# Patient Record
Sex: FEMALE | NOT HISPANIC OR LATINO | URBAN - METROPOLITAN AREA
[De-identification: names, ages, dates, MRNs, and addresses within clinical notes are randomized per-mention and may not be internally consistent; named-entity substitution may affect disease eponyms.]

---

## 2024-06-13 ENCOUNTER — OFFICE VISIT (OUTPATIENT)
Dept: PRIMARY CARE | Facility: CLINIC | Age: 54
End: 2024-06-13
Payer: COMMERCIAL

## 2024-06-13 VITALS
HEIGHT: 66 IN | OXYGEN SATURATION: 96 % | HEART RATE: 80 BPM | DIASTOLIC BLOOD PRESSURE: 80 MMHG | RESPIRATION RATE: 16 BRPM | BODY MASS INDEX: 32.47 KG/M2 | WEIGHT: 202 LBS | SYSTOLIC BLOOD PRESSURE: 124 MMHG

## 2024-06-13 DIAGNOSIS — Z12.31 SCREENING MAMMOGRAM FOR BREAST CANCER: ICD-10-CM

## 2024-06-13 DIAGNOSIS — D22.9 SKIN MOLE: ICD-10-CM

## 2024-06-13 DIAGNOSIS — J30.1 SEASONAL ALLERGIC RHINITIS DUE TO POLLEN: ICD-10-CM

## 2024-06-13 DIAGNOSIS — Z00.00 ROUTINE GENERAL MEDICAL EXAMINATION AT A HEALTH CARE FACILITY: Primary | ICD-10-CM

## 2024-06-13 DIAGNOSIS — Z13.6 SCREENING FOR CARDIOVASCULAR CONDITION: ICD-10-CM

## 2024-06-13 DIAGNOSIS — Z13.29 SCREENING FOR HYPOTHYROIDISM: ICD-10-CM

## 2024-06-13 DIAGNOSIS — I10 PRIMARY HYPERTENSION: ICD-10-CM

## 2024-06-13 DIAGNOSIS — R73.9 HYPERGLYCEMIA: ICD-10-CM

## 2024-06-13 DIAGNOSIS — S39.012A STRAIN OF LUMBAR REGION, INITIAL ENCOUNTER: ICD-10-CM

## 2024-06-13 PROCEDURE — 3074F SYST BP LT 130 MM HG: CPT | Performed by: INTERNAL MEDICINE

## 2024-06-13 PROCEDURE — 3079F DIAST BP 80-89 MM HG: CPT | Performed by: INTERNAL MEDICINE

## 2024-06-13 PROCEDURE — 99396 PREV VISIT EST AGE 40-64: CPT | Performed by: INTERNAL MEDICINE

## 2024-06-13 RX ORDER — CELECOXIB 200 MG/1
200 CAPSULE ORAL DAILY
COMMUNITY

## 2024-06-13 RX ORDER — HYDROXYZINE HYDROCHLORIDE 25 MG/1
25 TABLET, FILM COATED ORAL EVERY 8 HOURS
COMMUNITY
Start: 2022-02-21

## 2024-06-13 RX ORDER — CITALOPRAM 20 MG/1
20 TABLET, FILM COATED ORAL DAILY
COMMUNITY

## 2024-06-13 RX ORDER — ESTRADIOL 0.5 MG/.5G
0.5 GEL TOPICAL DAILY
COMMUNITY

## 2024-06-13 RX ORDER — VALACYCLOVIR HYDROCHLORIDE 1 G/1
1000 TABLET, FILM COATED ORAL 4 TIMES DAILY
COMMUNITY
Start: 2016-05-11

## 2024-06-13 RX ORDER — LORATADINE 10 MG/1
10 TABLET ORAL DAILY
Qty: 90 TABLET | Refills: 3 | Status: SHIPPED | OUTPATIENT
Start: 2024-06-13 | End: 2025-06-13

## 2024-06-13 ASSESSMENT — PATIENT HEALTH QUESTIONNAIRE - PHQ9
SUM OF ALL RESPONSES TO PHQ9 QUESTIONS 1 AND 2: 0
1. LITTLE INTEREST OR PLEASURE IN DOING THINGS: NOT AT ALL
2. FEELING DOWN, DEPRESSED OR HOPELESS: NOT AT ALL

## 2024-06-13 ASSESSMENT — ENCOUNTER SYMPTOMS
RHINORRHEA: 0
APPETITE CHANGE: 0
LOSS OF SENSATION IN FEET: 0
FEVER: 0
VOMITING: 0
CHILLS: 0
ABDOMINAL PAIN: 0
NAUSEA: 0
HEADACHES: 0
FATIGUE: 0
COUGH: 0
DIARRHEA: 0
SORE THROAT: 0
WEAKNESS: 0
SINUS PAIN: 0
SHORTNESS OF BREATH: 0
ARTHRALGIAS: 1
HEMATURIA: 0
DEPRESSION: 0
NERVOUS/ANXIOUS: 0
DIZZINESS: 0
SLEEP DISTURBANCE: 0
OCCASIONAL FEELINGS OF UNSTEADINESS: 0
PALPITATIONS: 0
FREQUENCY: 0
DIFFICULTY URINATING: 0
CONSTIPATION: 0
JOINT SWELLING: 0

## 2024-06-13 ASSESSMENT — PAIN SCALES - GENERAL: PAINLEVEL: 0-NO PAIN

## 2024-06-13 NOTE — PROGRESS NOTES
"Subjective   Patient ID: Anna Milan is a 54 y.o. female who presents for Annual Exam.    Patient is doing well overall. BP is nml. Denies chest pain and SOB, but reports congestion which she is managing with Claritin. She also reports mild, intermittent pain in her back that worsens at night. Has not received both doses of shingrix.       Diagnostics Reviewed:  Labs Reviewed:           Review of Systems   Constitutional:  Negative for appetite change, chills, fatigue and fever.   HENT:  Positive for congestion. Negative for ear pain, rhinorrhea, sinus pain and sore throat.    Eyes:  Negative for visual disturbance.   Respiratory:  Negative for cough and shortness of breath.    Cardiovascular:  Negative for chest pain and palpitations.   Gastrointestinal:  Negative for abdominal pain, constipation, diarrhea, nausea and vomiting.   Genitourinary:  Negative for difficulty urinating, frequency and hematuria.   Musculoskeletal:  Positive for arthralgias. Negative for joint swelling.   Skin:  Negative for rash.   Neurological:  Negative for dizziness, weakness and headaches.   Psychiatric/Behavioral:  Negative for sleep disturbance. The patient is not nervous/anxious.        Objective   /80   Pulse 80   Resp 16   Ht 1.664 m (5' 5.5\")   Wt 91.6 kg (202 lb)   SpO2 96%   BMI 33.10 kg/m²     Physical Exam  HENT:      Right Ear: Tympanic membrane normal.      Left Ear: Tympanic membrane normal.      Mouth/Throat:      Pharynx: Oropharynx is clear. No oropharyngeal exudate.   Eyes:      Conjunctiva/sclera: Conjunctivae normal.      Pupils: Pupils are equal, round, and reactive to light.   Neck:      Thyroid: No thyromegaly.      Vascular: No carotid bruit.   Cardiovascular:      Rate and Rhythm: Regular rhythm.      Heart sounds: Normal heart sounds.   Pulmonary:      Breath sounds: Normal breath sounds.   Abdominal:      Palpations: Abdomen is soft. There is no hepatomegaly.      Tenderness: There is no " abdominal tenderness.   Musculoskeletal:      Right hip: Normal.      Left hip: Normal.      Right knee: Normal.      Left knee: Normal.      Right lower leg: No edema.      Left lower leg: No edema.   Lymphadenopathy:      Cervical: No cervical adenopathy.   Skin:     General: Skin is warm.      Comments: No suspicious moles   Neurological:      Mental Status: She is alert and oriented to person, place, and time.      Gait: Gait is intact.   Psychiatric:         Mood and Affect: Mood and affect normal.         Behavior: Behavior normal. Behavior is cooperative.         Cognition and Memory: Cognition normal.         Assessment/Plan   Diagnoses and all orders for this visit:  Routine general medical examination at a health care facility  Seasonal allergic rhinitis due to pollen  -     loratadine (Claritin) 10 mg tablet; Take 1 tablet (10 mg) by mouth once daily.  -     CBC and Auto Differential; Future  -     Comprehensive Metabolic Panel; Future  -     Hemoglobin A1C; Future  -     TSH with reflex to Free T4 if abnormal; Future  -     Lipid Panel; Future  -     zoster vaccine-recombinant adjuvanted (Shingrix) 50 mcg/0.5 mL vaccine; Inject 0.5 mL into the muscle every 8 (eight) weeks.  Primary hypertension  -     CBC and Auto Differential; Future  -     Comprehensive Metabolic Panel; Future  -     Hemoglobin A1C; Future  -     TSH with reflex to Free T4 if abnormal; Future  -     Lipid Panel; Future  Hyperglycemia  -     CBC and Auto Differential; Future  -     Comprehensive Metabolic Panel; Future  -     Hemoglobin A1C; Future  -     TSH with reflex to Free T4 if abnormal; Future  -     Lipid Panel; Future  Screening for cardiovascular condition  -     CBC and Auto Differential; Future  -     Comprehensive Metabolic Panel; Future  -     Hemoglobin A1C; Future  -     TSH with reflex to Free T4 if abnormal; Future  -     Lipid Panel; Future  Screening for hypothyroidism  -     CBC and Auto Differential; Future  -      Comprehensive Metabolic Panel; Future  -     Hemoglobin A1C; Future  -     TSH with reflex to Free T4 if abnormal; Future  -     Lipid Panel; Future  Screening mammogram for breast cancer  -     CBC and Auto Differential; Future  -     Comprehensive Metabolic Panel; Future  -     Hemoglobin A1C; Future  -     TSH with reflex to Free T4 if abnormal; Future  -     Lipid Panel; Future  -     BI mammo bilateral screening tomosynthesis  Skin mole  -     Referral to Dermatology  Strain of lumbar region, initial encounter    Scribe Attestation  By signing my name below, ILaura, Scribbess   attest that this documentation has been prepared under the direction and in the presence of Dorcas Swan MD.

## 2024-06-27 ENCOUNTER — APPOINTMENT (OUTPATIENT)
Dept: RADIOLOGY | Facility: CLINIC | Age: 54
End: 2024-06-27
Payer: COMMERCIAL

## 2024-07-17 ENCOUNTER — HOSPITAL ENCOUNTER (OUTPATIENT)
Dept: RADIOLOGY | Facility: CLINIC | Age: 54
Discharge: HOME | End: 2024-07-17
Payer: COMMERCIAL

## 2024-07-17 PROCEDURE — 77067 SCR MAMMO BI INCL CAD: CPT

## 2024-07-18 ENCOUNTER — APPOINTMENT (OUTPATIENT)
Dept: RADIOLOGY | Facility: CLINIC | Age: 54
End: 2024-07-18
Payer: COMMERCIAL

## 2024-07-26 ENCOUNTER — APPOINTMENT (OUTPATIENT)
Dept: LAB | Facility: LAB | Age: 54
End: 2024-07-26
Payer: COMMERCIAL

## 2024-09-17 ENCOUNTER — APPOINTMENT (OUTPATIENT)
Dept: DERMATOLOGY | Facility: CLINIC | Age: 54
End: 2024-09-17
Payer: COMMERCIAL

## 2025-02-28 ENCOUNTER — OFFICE VISIT (OUTPATIENT)
Dept: PRIMARY CARE | Facility: CLINIC | Age: 55
End: 2025-02-28
Payer: COMMERCIAL

## 2025-02-28 VITALS
BODY MASS INDEX: 33.11 KG/M2 | DIASTOLIC BLOOD PRESSURE: 80 MMHG | RESPIRATION RATE: 16 BRPM | SYSTOLIC BLOOD PRESSURE: 142 MMHG | HEIGHT: 66 IN | HEART RATE: 76 BPM | OXYGEN SATURATION: 98 % | WEIGHT: 206 LBS

## 2025-02-28 DIAGNOSIS — I10 PRIMARY HYPERTENSION: ICD-10-CM

## 2025-02-28 DIAGNOSIS — Z13.29 SCREENING FOR HYPOTHYROIDISM: ICD-10-CM

## 2025-02-28 DIAGNOSIS — R73.9 HYPERGLYCEMIA: ICD-10-CM

## 2025-02-28 DIAGNOSIS — I10 ESSENTIAL (PRIMARY) HYPERTENSION: ICD-10-CM

## 2025-02-28 DIAGNOSIS — Z13.6 SCREENING FOR CARDIOVASCULAR CONDITION: ICD-10-CM

## 2025-02-28 DIAGNOSIS — M76.62 ACHILLES TENDINITIS OF LEFT LOWER EXTREMITY: Primary | ICD-10-CM

## 2025-02-28 PROCEDURE — 3077F SYST BP >= 140 MM HG: CPT | Performed by: INTERNAL MEDICINE

## 2025-02-28 PROCEDURE — 3008F BODY MASS INDEX DOCD: CPT | Performed by: INTERNAL MEDICINE

## 2025-02-28 PROCEDURE — 99213 OFFICE O/P EST LOW 20 MIN: CPT | Performed by: INTERNAL MEDICINE

## 2025-02-28 PROCEDURE — 3079F DIAST BP 80-89 MM HG: CPT | Performed by: INTERNAL MEDICINE

## 2025-02-28 RX ORDER — TRIAMTERENE/HYDROCHLOROTHIAZID 37.5-25 MG
1 TABLET ORAL DAILY
Qty: 90 TABLET | Refills: 3 | Status: SHIPPED | OUTPATIENT
Start: 2025-02-28 | End: 2026-02-23

## 2025-02-28 ASSESSMENT — PATIENT HEALTH QUESTIONNAIRE - PHQ9
2. FEELING DOWN, DEPRESSED OR HOPELESS: NOT AT ALL
SUM OF ALL RESPONSES TO PHQ9 QUESTIONS 1 AND 2: 0
1. LITTLE INTEREST OR PLEASURE IN DOING THINGS: NOT AT ALL

## 2025-02-28 ASSESSMENT — ENCOUNTER SYMPTOMS
DEPRESSION: 0
NAUSEA: 0
SHORTNESS OF BREATH: 0
ARTHRALGIAS: 1
LOSS OF SENSATION IN FEET: 0
DIARRHEA: 0
DIZZINESS: 0
ABDOMINAL PAIN: 0
CONSTIPATION: 0
PALPITATIONS: 0
COUGH: 0
MYALGIAS: 1
OCCASIONAL FEELINGS OF UNSTEADINESS: 0

## 2025-02-28 ASSESSMENT — PAIN SCALES - GENERAL: PAINLEVEL_OUTOF10: 6

## 2025-02-28 NOTE — PROGRESS NOTES
"Subjective   Patient ID: Anna Milan is a 54 y.o. female who presents for Fall and Shoulder Pain.    Patient had a recent fall episode where she slipped on ice and fell on her L shoulder which is now painful to touch. Also, gets some pain in her L heel/achilles tendon which is exacerbated with walking/putting pressure on it. Has been taking Celebrex to help with the pain.     Diagnostics Reviewed:  Labs Reviewed:         Review of Systems   Respiratory:  Negative for cough and shortness of breath.    Cardiovascular:  Negative for chest pain and palpitations.   Gastrointestinal:  Negative for abdominal pain, constipation, diarrhea and nausea.   Musculoskeletal:  Positive for arthralgias (shoulder) and myalgias (L achilles).   Neurological:  Negative for dizziness.     Objective   /80   Pulse 76   Resp 16   Ht 1.664 m (5' 5.5\")   Wt 93.4 kg (206 lb)   SpO2 98%   BMI 33.76 kg/m²     Physical Exam  Cardiovascular:      Rate and Rhythm: Normal rate and regular rhythm.      Heart sounds: Normal heart sounds.   Pulmonary:      Breath sounds: Normal breath sounds.   Abdominal:      Palpations: Abdomen is soft. There is no hepatomegaly.      Tenderness: There is no abdominal tenderness.   Musculoskeletal:         General: Swelling (L achilles tendon) present.      Right lower leg: No edema.      Left lower leg: No edema.   Neurological:      Mental Status: She is alert.      Gait: Gait normal.       Assessment/Plan   Diagnoses and all orders for this visit:  Achilles tendinitis of left lower extremity  -     Referral to Podiatry; Future  -     Referral to Physical Therapy; Future  -     CBC and Auto Differential; Future  -     Comprehensive Metabolic Panel; Future  -     Uric Acid; Future  -     Hemoglobin A1C; Future  -     Lipid Panel; Future  -     TSH with reflex to Free T4 if abnormal; Future  Primary hypertension  -     CBC and Auto Differential; Future  -     Comprehensive Metabolic Panel; Future  -     " Uric Acid; Future  -     Hemoglobin A1C; Future  -     Lipid Panel; Future  -     TSH with reflex to Free T4 if abnormal; Future  Hyperglycemia  -     CBC and Auto Differential; Future  -     Comprehensive Metabolic Panel; Future  -     Uric Acid; Future  -     Hemoglobin A1C; Future  -     Lipid Panel; Future  -     TSH with reflex to Free T4 if abnormal; Future  Screening for cardiovascular condition  -     CBC and Auto Differential; Future  -     Comprehensive Metabolic Panel; Future  -     Uric Acid; Future  -     Hemoglobin A1C; Future  -     Lipid Panel; Future  -     TSH with reflex to Free T4 if abnormal; Future  Screening for hypothyroidism  -     CBC and Auto Differential; Future  -     Comprehensive Metabolic Panel; Future  -     Uric Acid; Future  -     Hemoglobin A1C; Future  -     Lipid Panel; Future  -     TSH with reflex to Free T4 if abnormal; Future  Essential (primary) hypertension  -     CBC and Auto Differential; Future  -     Comprehensive Metabolic Panel; Future  -     Uric Acid; Future  -     Hemoglobin A1C; Future  -     Lipid Panel; Future  -     TSH with reflex to Free T4 if abnormal; Future  -     triamterene-hydrochlorothiazid (Maxzide-25) 37.5-25 mg tablet; Take 1 tablet by mouth once daily.      Scribe Attestation  By signing my name below, I, Nikky Negrete   attest that this documentation has been prepared under the direction and in the presence of Dorcas Swan MD.

## 2025-03-04 LAB
ALBUMIN SERPL-MCNC: 4.3 G/DL (ref 3.6–5.1)
ALP SERPL-CCNC: 84 U/L (ref 37–153)
ALT SERPL-CCNC: 17 U/L (ref 6–29)
ANION GAP SERPL CALCULATED.4IONS-SCNC: 11 MMOL/L (CALC) (ref 7–17)
AST SERPL-CCNC: 16 U/L (ref 10–35)
BASOPHILS # BLD AUTO: 50 CELLS/UL (ref 0–200)
BASOPHILS NFR BLD AUTO: 0.9 %
BILIRUB SERPL-MCNC: 1.2 MG/DL (ref 0.2–1.2)
BUN SERPL-MCNC: 14 MG/DL (ref 7–25)
CALCIUM SERPL-MCNC: 9.3 MG/DL (ref 8.6–10.4)
CHLORIDE SERPL-SCNC: 102 MMOL/L (ref 98–110)
CHOLEST SERPL-MCNC: 192 MG/DL
CHOLEST/HDLC SERPL: 2.5 (CALC)
CO2 SERPL-SCNC: 25 MMOL/L (ref 20–32)
CREAT SERPL-MCNC: 0.74 MG/DL (ref 0.5–1.03)
EGFRCR SERPLBLD CKD-EPI 2021: 95 ML/MIN/1.73M2
EOSINOPHIL # BLD AUTO: 218 CELLS/UL (ref 15–500)
EOSINOPHIL NFR BLD AUTO: 3.9 %
ERYTHROCYTE [DISTWIDTH] IN BLOOD BY AUTOMATED COUNT: 12 % (ref 11–15)
EST. AVERAGE GLUCOSE BLD GHB EST-MCNC: 111 MG/DL
EST. AVERAGE GLUCOSE BLD GHB EST-SCNC: 6.2 MMOL/L
GLUCOSE SERPL-MCNC: 88 MG/DL (ref 65–99)
HBA1C MFR BLD: 5.5 % OF TOTAL HGB
HCT VFR BLD AUTO: 43.1 % (ref 35–45)
HDLC SERPL-MCNC: 78 MG/DL
HGB BLD-MCNC: 14.3 G/DL (ref 11.7–15.5)
LDLC SERPL CALC-MCNC: 94 MG/DL (CALC)
LYMPHOCYTES # BLD AUTO: 1663 CELLS/UL (ref 850–3900)
LYMPHOCYTES NFR BLD AUTO: 29.7 %
MCH RBC QN AUTO: 28.8 PG (ref 27–33)
MCHC RBC AUTO-ENTMCNC: 33.2 G/DL (ref 32–36)
MCV RBC AUTO: 86.7 FL (ref 80–100)
MONOCYTES # BLD AUTO: 549 CELLS/UL (ref 200–950)
MONOCYTES NFR BLD AUTO: 9.8 %
NEUTROPHILS # BLD AUTO: 3119 CELLS/UL (ref 1500–7800)
NEUTROPHILS NFR BLD AUTO: 55.7 %
NONHDLC SERPL-MCNC: 114 MG/DL (CALC)
PLATELET # BLD AUTO: 292 THOUSAND/UL (ref 140–400)
PMV BLD REES-ECKER: 10.2 FL (ref 7.5–12.5)
POTASSIUM SERPL-SCNC: 4.1 MMOL/L (ref 3.5–5.3)
PROT SERPL-MCNC: 7.4 G/DL (ref 6.1–8.1)
RBC # BLD AUTO: 4.97 MILLION/UL (ref 3.8–5.1)
SODIUM SERPL-SCNC: 138 MMOL/L (ref 135–146)
T4 FREE SERPL-MCNC: 1.2 NG/DL (ref 0.8–1.8)
TRIGL SERPL-MCNC: 100 MG/DL
TSH SERPL-ACNC: 0.35 MIU/L (ref 0.4–4.5)
URATE SERPL-MCNC: 5.4 MG/DL (ref 2.5–7)
WBC # BLD AUTO: 5.6 THOUSAND/UL (ref 3.8–10.8)

## 2025-06-19 ENCOUNTER — OFFICE VISIT (OUTPATIENT)
Dept: PRIMARY CARE | Facility: CLINIC | Age: 55
End: 2025-06-19
Payer: COMMERCIAL

## 2025-06-19 ENCOUNTER — PHARMACY VISIT (OUTPATIENT)
Dept: PHARMACY | Facility: CLINIC | Age: 55
End: 2025-06-19
Payer: COMMERCIAL

## 2025-06-19 VITALS
DIASTOLIC BLOOD PRESSURE: 84 MMHG | BODY MASS INDEX: 32.62 KG/M2 | HEIGHT: 66 IN | WEIGHT: 203 LBS | RESPIRATION RATE: 16 BRPM | SYSTOLIC BLOOD PRESSURE: 140 MMHG | OXYGEN SATURATION: 98 % | HEART RATE: 77 BPM

## 2025-06-19 DIAGNOSIS — F43.29 GRIEF REACTION WITH PROLONGED BEREAVEMENT: ICD-10-CM

## 2025-06-19 DIAGNOSIS — E66.811 CLASS 1 OBESITY DUE TO EXCESS CALORIES WITH SERIOUS COMORBIDITY AND BODY MASS INDEX (BMI) OF 33.0 TO 33.9 IN ADULT: ICD-10-CM

## 2025-06-19 DIAGNOSIS — Z12.31 SCREENING MAMMOGRAM FOR BREAST CANCER: ICD-10-CM

## 2025-06-19 DIAGNOSIS — M19.071 ARTHRITIS OF RIGHT FOOT: ICD-10-CM

## 2025-06-19 DIAGNOSIS — I10 PRIMARY HYPERTENSION: ICD-10-CM

## 2025-06-19 DIAGNOSIS — Z91.89 INCREASED RISK OF BREAST CANCER: ICD-10-CM

## 2025-06-19 DIAGNOSIS — Z00.00 ROUTINE GENERAL MEDICAL EXAMINATION AT A HEALTH CARE FACILITY: Primary | ICD-10-CM

## 2025-06-19 DIAGNOSIS — E66.09 CLASS 1 OBESITY DUE TO EXCESS CALORIES WITH SERIOUS COMORBIDITY AND BODY MASS INDEX (BMI) OF 33.0 TO 33.9 IN ADULT: ICD-10-CM

## 2025-06-19 DIAGNOSIS — I10 ESSENTIAL (PRIMARY) HYPERTENSION: ICD-10-CM

## 2025-06-19 PROCEDURE — 99213 OFFICE O/P EST LOW 20 MIN: CPT | Performed by: INTERNAL MEDICINE

## 2025-06-19 PROCEDURE — 3079F DIAST BP 80-89 MM HG: CPT | Performed by: INTERNAL MEDICINE

## 2025-06-19 PROCEDURE — 3077F SYST BP >= 140 MM HG: CPT | Performed by: INTERNAL MEDICINE

## 2025-06-19 PROCEDURE — 99396 PREV VISIT EST AGE 40-64: CPT | Performed by: INTERNAL MEDICINE

## 2025-06-19 PROCEDURE — 3008F BODY MASS INDEX DOCD: CPT | Performed by: INTERNAL MEDICINE

## 2025-06-19 PROCEDURE — RXMED WILLOW AMBULATORY MEDICATION CHARGE

## 2025-06-19 RX ORDER — ZOSTER VACCINE RECOMBINANT, ADJUVANTED 50 MCG/0.5
KIT INTRAMUSCULAR
Qty: 1 EACH | Refills: 1 | OUTPATIENT
Start: 2025-06-19

## 2025-06-19 RX ORDER — BUPROPION HYDROCHLORIDE 150 MG/1
150 TABLET ORAL EVERY MORNING
Qty: 90 TABLET | Refills: 3 | Status: SHIPPED | OUTPATIENT
Start: 2025-06-19 | End: 2026-06-19

## 2025-06-19 ASSESSMENT — ENCOUNTER SYMPTOMS
COUGH: 0
NERVOUS/ANXIOUS: 0
DEPRESSION: 0
NAUSEA: 0
DIZZINESS: 0
CONSTIPATION: 0
FATIGUE: 0
FREQUENCY: 0
SORE THROAT: 0
JOINT SWELLING: 0
HEADACHES: 0
DIARRHEA: 0
SHORTNESS OF BREATH: 0
CHILLS: 0
DYSURIA: 0
SINUS PAIN: 0
APPETITE CHANGE: 0
OCCASIONAL FEELINGS OF UNSTEADINESS: 0
RHINORRHEA: 0
WEAKNESS: 0
ABDOMINAL PAIN: 0
ARTHRALGIAS: 1
DIFFICULTY URINATING: 0
LOSS OF SENSATION IN FEET: 0
SLEEP DISTURBANCE: 0
PALPITATIONS: 0
VOMITING: 0
FEVER: 0
HEMATURIA: 0

## 2025-06-19 ASSESSMENT — PAIN SCALES - GENERAL: PAINLEVEL_OUTOF10: 0-NO PAIN

## 2025-06-19 NOTE — PROGRESS NOTES
"Subjective   Patient ID: Anna Milan is a 55 y.o. female who presents for Annual Exam.    Patient is doing well overall but, sometimes, gets arthritic pains in her R leg which can be temporarily alleviated with Celebrex. Fhx of breast cancer in paternal grandmother and gout. Sees ophthalmologist and dentist regularly. Denies snoring but reports heaving breathing while sleeping. In terms of vaccines, is up to date on all of them except for both doses of shingrix.      Diagnostics Reviewed: 5/6/2021 Cologuard: Was positive.         6/16/2021 Colonoscopy: Revealed two polyps and diverticulosis. Repeat colonoscopy in 5 years.     7/17/2024 BI Mammo Bilateral: Was normal.   Labs Reviewed: 3/3/2025 Blood Work: TSH 0.35 but otherwise normal.             Review of Systems   Constitutional:  Negative for appetite change, chills, fatigue and fever.   HENT:  Negative for ear pain, rhinorrhea, sinus pain and sore throat.    Eyes:  Negative for visual disturbance.   Respiratory:  Negative for cough and shortness of breath.    Cardiovascular:  Negative for chest pain and palpitations.   Gastrointestinal:  Negative for abdominal pain, constipation, diarrhea, nausea and vomiting.   Genitourinary:  Negative for difficulty urinating, dysuria, frequency and hematuria.   Musculoskeletal:  Positive for arthralgias (R leg). Negative for joint swelling.   Skin:  Negative for rash.   Neurological:  Negative for dizziness, weakness and headaches.   Psychiatric/Behavioral:  Negative for sleep disturbance. The patient is not nervous/anxious.        Objective   /84   Pulse 77   Resp 16   Ht 1.664 m (5' 5.5\")   Wt 92.1 kg (203 lb)   SpO2 98%   BMI 33.27 kg/m²     Physical Exam  HENT:      Right Ear: Tympanic membrane normal. There is no impacted cerumen.      Left Ear: Tympanic membrane normal. There is no impacted cerumen.      Mouth/Throat:      Pharynx: Oropharynx is clear. No oropharyngeal exudate.   Eyes:      " Conjunctiva/sclera: Conjunctivae normal.      Pupils: Pupils are equal, round, and reactive to light.   Neck:      Thyroid: No thyromegaly.      Vascular: No carotid bruit.   Cardiovascular:      Rate and Rhythm: Regular rhythm.      Heart sounds: Normal heart sounds.   Pulmonary:      Breath sounds: Normal breath sounds.   Chest:   Breasts:     Right: Normal.      Left: Normal.   Abdominal:      Palpations: Abdomen is soft. There is no hepatomegaly.      Tenderness: There is no abdominal tenderness.   Musculoskeletal:      Right lower leg: No edema.      Left lower leg: No edema.   Lymphadenopathy:      Cervical: No cervical adenopathy.   Skin:     General: Skin is warm.      Comments: No suspicious moles   Neurological:      Mental Status: She is alert and oriented to person, place, and time.      Gait: Gait is intact.   Psychiatric:         Mood and Affect: Mood and affect normal.         Behavior: Behavior normal. Behavior is cooperative.         Cognition and Memory: Cognition normal.         Assessment/Plan   There are no diagnoses linked to this encounter.    Scribe Attestation  By signing my name below, IJack, Scribbess   attest that this documentation has been prepared under the direction and in the presence of oDrcas Swan MD.        split.  Arthritis of right foot      Scribe Attestation  By signing my name below, I, Nikky Negrete   attest that this documentation has been prepared under the direction and in the presence of Dorcas Swan MD.

## 2025-06-19 NOTE — PATIENT INSTRUCTIONS
In terms of diet, try to consume 60 grams of protein per day spread out amongst three meals (20 grams of protein per meal). Protein should come from sources such as meat, low fat dairy and eggs. When eating meals, always start with the protein. Try to avoid pasta, potatoes, bread, rice, oatmeal, cereal, fruit, peas and corn because they are high in sugars.

## 2025-06-26 ENCOUNTER — APPOINTMENT (OUTPATIENT)
Dept: VASCULAR SURGERY | Facility: CLINIC | Age: 55
End: 2025-06-26
Payer: COMMERCIAL

## 2025-08-03 ENCOUNTER — HOSPITAL ENCOUNTER (EMERGENCY)
Facility: HOSPITAL | Age: 55
Discharge: HOME | End: 2025-08-03
Attending: STUDENT IN AN ORGANIZED HEALTH CARE EDUCATION/TRAINING PROGRAM
Payer: COMMERCIAL

## 2025-08-03 ENCOUNTER — APPOINTMENT (OUTPATIENT)
Dept: RADIOLOGY | Facility: HOSPITAL | Age: 55
End: 2025-08-03
Payer: COMMERCIAL

## 2025-08-03 VITALS
WEIGHT: 198 LBS | HEIGHT: 66 IN | OXYGEN SATURATION: 96 % | RESPIRATION RATE: 16 BRPM | HEART RATE: 84 BPM | SYSTOLIC BLOOD PRESSURE: 135 MMHG | BODY MASS INDEX: 31.82 KG/M2 | DIASTOLIC BLOOD PRESSURE: 79 MMHG | TEMPERATURE: 97.5 F

## 2025-08-03 DIAGNOSIS — S01.511A LIP LACERATION, INITIAL ENCOUNTER: ICD-10-CM
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
## 2025-08-03 DIAGNOSIS — S09.90XA HEAD INJURY, INITIAL ENCOUNTER: ICD-10-CM

## 2025-08-03 PROCEDURE — 99282 EMERGENCY DEPT VISIT SF MDM: CPT | Performed by: STUDENT IN AN ORGANIZED HEALTH CARE EDUCATION/TRAINING PROGRAM

## 2025-08-03 PROCEDURE — 2500000004 HC RX 250 GENERAL PHARMACY W/ HCPCS (ALT 636 FOR OP/ED)

## 2025-08-03 PROCEDURE — 12011 RPR F/E/E/N/L/M 2.5 CM/<: CPT | Performed by: STUDENT IN AN ORGANIZED HEALTH CARE EDUCATION/TRAINING PROGRAM

## 2025-08-03 RX ORDER — LIDOCAINE HYDROCHLORIDE 10 MG/ML
10 INJECTION, SOLUTION INFILTRATION; PERINEURAL ONCE
Status: COMPLETED | OUTPATIENT
Start: 2025-08-03 | End: 2025-08-03

## 2025-08-03 RX ORDER — LIDOCAINE HYDROCHLORIDE 10 MG/ML
INJECTION, SOLUTION INFILTRATION; PERINEURAL
Status: COMPLETED
Start: 2025-08-03 | End: 2025-08-03

## 2025-08-03 RX ADMIN — LIDOCAINE HYDROCHLORIDE 10 ML: 10 INJECTION, SOLUTION INFILTRATION; PERINEURAL at 13:00

## 2025-08-03 ASSESSMENT — PAIN SCALES - GENERAL: PAINLEVEL_OUTOF10: 3

## 2025-08-03 ASSESSMENT — PAIN DESCRIPTION - LOCATION: LOCATION: FACE

## 2025-08-03 ASSESSMENT — PAIN DESCRIPTION - FREQUENCY: FREQUENCY: CONSTANT/CONTINUOUS

## 2025-08-03 ASSESSMENT — PAIN - FUNCTIONAL ASSESSMENT: PAIN_FUNCTIONAL_ASSESSMENT: 0-10

## 2025-08-03 NOTE — ED PROVIDER NOTES
Emergency Department Provider Note       History of Present Illness     History provided by: Patient  Limitations to History: None  External Records Reviewed with Brief Summary: None    HPI:  Anna Milan is a 55 y.o. female presenting following a fall at home. She states this fall happened just over 10 minutes before her presentation to the ED, her timing is based on her drive to the ED. She states she was moving grill equipment on her deck and was holding a metal grill, when she missed 1 of 3 steps off her deck. She fell down the remaining stairs and struck her head under the chin. She cannot specify what she hit her head on. She endorses a laceration above the superior lip. She additionally shows superficial scrapes with peeled skin on the right hand. She denies any new or worsening headache, vision changes, lightheadedness or dizziness, loss of consciousness, chest pain, shortness of breath, neck of back pain, abdominal pain, nausea or vomiting, changes in sensation of all extremities, changes in bowel or bladder habits. She is up to date on he tetanus immunization.    Physical Exam   Triage vitals:  T 36.4 °C (97.5 °F)  HR 84  /79  RR 16  O2 96 % None (Room air)    General: Awake, alert, in no acute distress  Eyes: Gaze conjugate.  No scleral icterus or injection  HENT: Normo-cephalic, atraumatic. No stridor  CV: Regular rate, regular rhythm. Radial pulses 2+ bilaterally  Resp: Breathing non-labored, speaking in full sentences.  Clear to auscultation bilaterally  GI: Soft, non-distended, non-tender. No rebound or guarding.  MSK/Extremities: No gross bony deformities. Moving all extremities  Skin: Warm. Appropriate color. Superficial Abrasions present over the right dorsal thumb and hand, all wounds appear clean and are not bleeding. Approximately 1-2cm length laceration present superior to the upper lip, oozing bright red blood, swelling is present inside the mouth on the upper lip corresponding  to the area of the laceration.  Neuro: Alert. Oriented. Face symmetric. Speech is fluent.  Gross strength and sensation intact in b/l UE and LEs  Psych: Appropriate mood and affect      Medical Decision Making & ED Course   Medical Decision Makin y.o. female presenting following a fall with head strike. Primary concerns of patient are swollen nose and laceration above lip. Patient does not have focal neurological deficits, however due to age, a CT of the head without contrast is indicated to assess for tearing of bridging veins. Per NEXUS criteria, patient has distracting injury (lip laceration), which cannot clear the C spine, CT cervical spine indicated. Will additionally use CT of maxillofacial bones and 3D reconstruction to assess for maxillofacial injury due to head strike and swollen nose. Patient declines all CT scans. Educated patient of risks of potential for intracranial bleed due to fall with head strike, patient declines CT exams. Laceration superior to upper lip crosses into lip, closed with 1 medial suture by Dr. Hernandez and with glue by note author. Patient is advised to follow-up with PCP in 5-7 days for suture removal. Patient is stable and can be discharged to home.   ----    Differential diagnoses considered include but are not limited to: concussion, intracranial hemorrhage, head contusion    Social Determinants of Health which Significantly Impact Care: Social Determinants of Health which Significantly Impact Care: None identified     EKG Independent Interpretation: EKG not obtained    Independent Result Review and Interpretation: Relevant laboratory and radiographic results were reviewed and independently interpreted by myself.  As necessary, they are commented on in the ED Course.    Chronic conditions affecting the patient's care: As documented above in MDM    The patient was discussed with the following consultants/services: None    Care Considerations: As documented above in  [facility]    ED Course:  ED Course as of 08/03/25 1352   Sun Aug 03, 2025   1154 Tdap 5/2022 [HD]      ED Course User Index  [HD] Carlota Hernandez DO         Diagnoses as of 08/03/25 1352   Fall, initial encounter   Lip laceration, initial encounter   Head injury, initial encounter       Disposition   As a result of the work-up, the patient was discharged home.  she was informed of her diagnosis and instructed to come back with any concerns or worsening of condition.  she and was agreeable to the plan as discussed above.  she was given the opportunity to ask questions.  All of the patient's questions were answered.    Procedures   Procedures    Patient seen and discussed with ED attending physician.    DO Josep Coates DO  08/03/25 1418

## 2025-08-03 NOTE — DISCHARGE INSTRUCTIONS
- Follow up with PCP in 5-7 days for suture removal.  - Return to ED if area of lip laceration becomes red, hot, and/or swollen

## 2025-08-04 DIAGNOSIS — G47.33 OBSTRUCTIVE SLEEP APNEA (ADULT) (PEDIATRIC): ICD-10-CM

## 2025-08-04 NOTE — ED PROCEDURE NOTE
Procedure  Laceration Repair    Performed by: Carlota Hernandez DO  Authorized by: Carlota Hernandez DO    Consent:     Consent obtained:  Verbal    Consent given by:  Patient    Risks, benefits, and alternatives were discussed: yes      Risks discussed:  Infection, pain and poor cosmetic result    Alternatives discussed:  No treatment  Universal protocol:     Patient identity confirmed:  Verbally with patient  Anesthesia:     Anesthesia method:  Local infiltration    Local anesthetic:  Lidocaine 1% w/o epi  Laceration details:     Location:  Face    Facial location: above upper lip.    Length (cm):  2  Treatment:     Area cleansed with:  Chlorhexidine    Amount of cleaning:  Standard    Irrigation solution:  Sterile saline  Skin repair:     Repair method:  Sutures    Suture size:  6-0    Suture material:  Prolene    Suture technique:  Simple interrupted    Number of sutures:  1  Approximation:     Approximation:  Close  Repair type:     Repair type:  Simple  Post-procedure details:     Dressing:  Open (no dressing)    Procedure completion:  Tolerated               Carlota Hernandez DO  08/04/25 0656

## 2025-08-09 ENCOUNTER — APPOINTMENT (OUTPATIENT)
Dept: RADIOLOGY | Facility: HOSPITAL | Age: 55
End: 2025-08-09
Payer: COMMERCIAL

## 2025-08-14 ENCOUNTER — CLINICAL SUPPORT (OUTPATIENT)
Dept: SLEEP MEDICINE | Facility: HOSPITAL | Age: 55
End: 2025-08-14
Payer: COMMERCIAL

## 2025-08-14 ENCOUNTER — OFFICE VISIT (OUTPATIENT)
Dept: PRIMARY CARE | Facility: CLINIC | Age: 55
End: 2025-08-14
Payer: COMMERCIAL

## 2025-08-14 VITALS
RESPIRATION RATE: 16 BRPM | OXYGEN SATURATION: 98 % | WEIGHT: 198 LBS | HEIGHT: 66 IN | DIASTOLIC BLOOD PRESSURE: 72 MMHG | HEART RATE: 109 BPM | SYSTOLIC BLOOD PRESSURE: 120 MMHG | BODY MASS INDEX: 31.82 KG/M2

## 2025-08-14 DIAGNOSIS — Z48.02 VISIT FOR SUTURE REMOVAL: Primary | ICD-10-CM

## 2025-08-14 DIAGNOSIS — G47.33 OBSTRUCTIVE SLEEP APNEA (ADULT) (PEDIATRIC): ICD-10-CM

## 2025-08-14 PROCEDURE — 99212 OFFICE O/P EST SF 10 MIN: CPT | Performed by: INTERNAL MEDICINE

## 2025-08-14 PROCEDURE — 95806 SLEEP STUDY UNATT&RESP EFFT: CPT | Performed by: GENERAL PRACTICE

## 2025-08-14 PROCEDURE — 3008F BODY MASS INDEX DOCD: CPT | Performed by: INTERNAL MEDICINE

## 2025-08-14 RX ORDER — PREDNISONE 10 MG/1
10 TABLET ORAL DAILY
COMMUNITY
Start: 2025-08-02 | End: 2025-08-17

## 2025-08-14 ASSESSMENT — ENCOUNTER SYMPTOMS
COUGH: 0
NAUSEA: 0
ARTHRALGIAS: 0
CONSTIPATION: 0
ABDOMINAL PAIN: 0
PALPITATIONS: 0
OCCASIONAL FEELINGS OF UNSTEADINESS: 0
SHORTNESS OF BREATH: 0
DEPRESSION: 0
DIARRHEA: 0
DIZZINESS: 0
LOSS OF SENSATION IN FEET: 0

## 2025-08-14 ASSESSMENT — PAIN SCALES - GENERAL: PAINLEVEL_OUTOF10: 0-NO PAIN

## 2025-08-14 ASSESSMENT — PATIENT HEALTH QUESTIONNAIRE - PHQ9
2. FEELING DOWN, DEPRESSED OR HOPELESS: NOT AT ALL
1. LITTLE INTEREST OR PLEASURE IN DOING THINGS: NOT AT ALL
SUM OF ALL RESPONSES TO PHQ9 QUESTIONS 1 AND 2: 0

## 2025-08-21 ENCOUNTER — APPOINTMENT (OUTPATIENT)
Dept: RADIOLOGY | Facility: HOSPITAL | Age: 55
End: 2025-08-21
Payer: COMMERCIAL

## 2025-08-21 VITALS — BODY MASS INDEX: 25.34 KG/M2 | WEIGHT: 157 LBS

## 2025-08-21 DIAGNOSIS — Z91.89 INCREASED RISK OF BREAST CANCER: ICD-10-CM

## 2025-08-21 PROCEDURE — 77049 MRI BREAST C-+ W/CAD BI: CPT

## 2025-08-21 PROCEDURE — A9575 INJ GADOTERATE MEGLUMI 0.1ML: HCPCS | Performed by: INTERNAL MEDICINE

## 2025-08-21 PROCEDURE — 77049 MRI BREAST C-+ W/CAD BI: CPT | Performed by: STUDENT IN AN ORGANIZED HEALTH CARE EDUCATION/TRAINING PROGRAM

## 2025-08-21 PROCEDURE — 2550000001 HC RX 255 CONTRASTS: Performed by: INTERNAL MEDICINE

## 2025-08-21 RX ORDER — GADOTERATE MEGLUMINE 376.9 MG/ML
15 INJECTION INTRAVENOUS
Status: COMPLETED | OUTPATIENT
Start: 2025-08-21 | End: 2025-08-21

## 2025-08-21 RX ADMIN — GADOTERATE MEGLUMINE 15 ML: 376.9 INJECTION INTRAVENOUS at 15:32

## 2025-08-29 ENCOUNTER — APPOINTMENT (OUTPATIENT)
Facility: CLINIC | Age: 55
End: 2025-08-29
Payer: COMMERCIAL

## 2025-08-29 VITALS
WEIGHT: 204 LBS | BODY MASS INDEX: 32.78 KG/M2 | SYSTOLIC BLOOD PRESSURE: 125 MMHG | HEIGHT: 66 IN | DIASTOLIC BLOOD PRESSURE: 85 MMHG

## 2025-08-29 DIAGNOSIS — Z78.0 MENOPAUSE: Primary | ICD-10-CM

## 2025-08-29 DIAGNOSIS — R63.5 WEIGHT GAIN: ICD-10-CM

## 2025-08-29 DIAGNOSIS — G47.09 TROUBLE GETTING TO SLEEP: ICD-10-CM

## 2025-08-29 DIAGNOSIS — R45.86 MOOD SWINGS: ICD-10-CM

## 2025-08-29 PROCEDURE — 99204 OFFICE O/P NEW MOD 45 MIN: CPT | Performed by: OBSTETRICS & GYNECOLOGY

## 2025-08-29 PROCEDURE — 3008F BODY MASS INDEX DOCD: CPT | Performed by: OBSTETRICS & GYNECOLOGY

## 2025-08-29 RX ORDER — PROGESTERONE 100 MG/1
100 CAPSULE ORAL NIGHTLY
Qty: 90 CAPSULE | Refills: 3 | Status: SHIPPED | OUTPATIENT
Start: 2025-08-29 | End: 2026-08-29

## 2025-08-29 RX ORDER — ESTRADIOL 0.5 MG/.5G
1 GEL TOPICAL DAILY
Qty: 30 EACH | Refills: 3 | Status: SHIPPED | OUTPATIENT
Start: 2025-08-29

## 2025-09-03 DIAGNOSIS — G47.30 SLEEP APNEA, UNSPECIFIED TYPE: ICD-10-CM

## 2025-10-31 ENCOUNTER — APPOINTMENT (OUTPATIENT)
Facility: CLINIC | Age: 55
End: 2025-10-31
Payer: COMMERCIAL

## 2026-06-25 ENCOUNTER — APPOINTMENT (OUTPATIENT)
Dept: PRIMARY CARE | Facility: CLINIC | Age: 56
End: 2026-06-25
Payer: COMMERCIAL